# Patient Record
Sex: FEMALE | Race: WHITE
[De-identification: names, ages, dates, MRNs, and addresses within clinical notes are randomized per-mention and may not be internally consistent; named-entity substitution may affect disease eponyms.]

---

## 2019-06-11 ENCOUNTER — HOSPITAL ENCOUNTER (INPATIENT)
Dept: HOSPITAL 84 - D.PSYCH | Age: 84
LOS: 10 days | Discharge: HOME | DRG: 57 | End: 2019-06-21
Attending: PSYCHIATRY & NEUROLOGY | Admitting: PSYCHIATRY & NEUROLOGY
Payer: MEDICARE

## 2019-06-11 VITALS
BODY MASS INDEX: 26.36 KG/M2 | WEIGHT: 134.28 LBS | BODY MASS INDEX: 26.36 KG/M2 | BODY MASS INDEX: 26.36 KG/M2 | HEIGHT: 60 IN | HEIGHT: 60 IN | WEIGHT: 134.28 LBS

## 2019-06-11 VITALS — SYSTOLIC BLOOD PRESSURE: 162 MMHG | DIASTOLIC BLOOD PRESSURE: 65 MMHG

## 2019-06-11 DIAGNOSIS — Z85.3: ICD-10-CM

## 2019-06-11 DIAGNOSIS — E03.9: ICD-10-CM

## 2019-06-11 DIAGNOSIS — E78.5: ICD-10-CM

## 2019-06-11 DIAGNOSIS — K58.9: ICD-10-CM

## 2019-06-11 DIAGNOSIS — G30.1: Primary | ICD-10-CM

## 2019-06-11 DIAGNOSIS — F02.81: ICD-10-CM

## 2019-06-11 DIAGNOSIS — I25.10: ICD-10-CM

## 2019-06-11 DIAGNOSIS — E55.9: ICD-10-CM

## 2019-06-11 DIAGNOSIS — I10: ICD-10-CM

## 2019-06-11 NOTE — NUR
ED Tech bedside obtaining IV access. PATIENT ADMITTED FROM [place] [facility] PER [facility] STAFF. PATIENT WAS
COMBATIVE WITH STAFF AT [facility]. PATIENT REFUSED TO SHOWER FOR A
MONTH SHE WOULD THROW SHAMPOOS BOTTLES AT STAFF, VERBALLY AGGRESSION, HIT
STAFF WITH A TRASH CAN, AND ATIVAN DID NOT WORK. PATIENT IS A DNR PAPERWORK
IS ON THE CHART. WILL CALL POA AND OBTAIN CODE WORD. PATIENT REFUSED BODY
AUDIT AT THIS TIME. WILL TRY AGAIN LATER.

## 2019-06-11 NOTE — NUR
PATIENT IN WHEELCHAIR COMBATIVE WITH STAFF, BITING AND KICKING AT THE DOOR.
STAFF UNABLE TO REDIRECT PATIENT. NURSE ADMINISTERED ATIVAN 0.5 MG AND HALDOL
2 MG PER DR. DHILLON ORDER IM IN LD. WILL REASSES Q 1 HOUR FOR EFFECTIVENESS.

## 2019-06-11 NOTE — NUR
RECIEVED IN HALLWAY OUTSIDE OF NURSES STATION. VERY CONFUSED. WANDERING
AROUND. CALM AND COOPERATIVE WITH CARE AND ADMISSION ASSESSMENT. NO AGGRESSIVE
BEHAVIORS THIS EVENING. REDIRECT AND REORIENT AS NEEDED. RESTING IN BED WITH
EYES CLOSED AT THIS TIME. CONTINUE PLAN OF CARE.

## 2019-06-12 VITALS — DIASTOLIC BLOOD PRESSURE: 79 MMHG | SYSTOLIC BLOOD PRESSURE: 125 MMHG

## 2019-06-12 VITALS — SYSTOLIC BLOOD PRESSURE: 127 MMHG | DIASTOLIC BLOOD PRESSURE: 88 MMHG

## 2019-06-12 LAB
ALBUMIN SERPL-MCNC: 3.3 G/DL (ref 3.4–5)
ALP SERPL-CCNC: 67 U/L (ref 46–116)
ALT SERPL-CCNC: 20 U/L (ref 10–68)
ANION GAP SERPL CALC-SCNC: 13.8 MMOL/L (ref 8–16)
BASOPHILS NFR BLD AUTO: 0.2 % (ref 0–2)
BILIRUB SERPL-MCNC: 0.72 MG/DL (ref 0.2–1.3)
BUN SERPL-MCNC: 18 MG/DL (ref 7–18)
CALCIUM SERPL-MCNC: 8.8 MG/DL (ref 8.5–10.1)
CHLORIDE SERPL-SCNC: 104 MMOL/L (ref 98–107)
CHOLEST/HDLC SERPL: 2.3 RATIO (ref 2.3–4.1)
CO2 SERPL-SCNC: 23.9 MMOL/L (ref 21–32)
CREAT SERPL-MCNC: 1.4 MG/DL (ref 0.6–1.3)
EOSINOPHIL NFR BLD: 1 % (ref 0–7)
ERYTHROCYTE [DISTWIDTH] IN BLOOD BY AUTOMATED COUNT: 13.5 % (ref 11.5–14.5)
EST. AVERAGE GLUCOSE BLD GHB EST-MCNC: 126 MG/DL (ref 74–154)
GLOBULIN SER-MCNC: 3.8 G/L
GLUCOSE SERPL-MCNC: 89 MG/DL (ref 74–106)
HCT VFR BLD CALC: 36.9 % (ref 36–48)
HDLC SERPL-MCNC: 62 MG/DL (ref 32–96)
HGB BLD-MCNC: 12.4 G/DL (ref 12–16)
IMM GRANULOCYTES NFR BLD: 0.2 % (ref 0–5)
LDL-HDL RATIO: 1 RATIO (ref 1.5–3.5)
LDLC SERPL-MCNC: 60 MG/DL (ref 0–100)
LYMPHOCYTES NFR BLD AUTO: 27.7 % (ref 15–50)
MCH RBC QN AUTO: 29.2 PG (ref 26–34)
MCHC RBC AUTO-ENTMCNC: 33.6 G/DL (ref 31–37)
MCV RBC: 87 FL (ref 80–100)
MONOCYTES NFR BLD: 10.1 % (ref 2–11)
NEUTROPHILS NFR BLD AUTO: 60.8 % (ref 40–80)
OSMOLALITY SERPL CALC.SUM OF ELEC: 276 MOSM/KG (ref 275–300)
PLATELET # BLD: 238 10X3/UL (ref 130–400)
PMV BLD AUTO: 9.1 FL (ref 7.4–10.4)
POTASSIUM SERPL-SCNC: 3.7 MMOL/L (ref 3.5–5.1)
PROT SERPL-MCNC: 7.1 G/DL (ref 6.4–8.2)
RBC # BLD AUTO: 4.24 10X6/UL (ref 4–5.4)
SODIUM SERPL-SCNC: 138 MMOL/L (ref 136–145)
TRIGL SERPL-MCNC: 96 MG/DL (ref 30–200)
TSH SERPL-ACNC: 5.26 UIU/ML (ref 0.36–3.74)
WBC # BLD AUTO: 6 10X3/UL (ref 4.8–10.8)

## 2019-06-12 NOTE — NUR
PATIENT IS AWAKE AND ORIENTED TO SELF.  SHE GOT AGGRESSIVE WITH STAFF WHEN SHE
INSISTED TO GO TO HER ROOM AND LAY DOWN.  SHE HIT THIS NURSE AND GRABBED THE
PHONE CORD.  REDIRECT AND REORIENT AS NEEDED.  WILL CONTINUE PLAN OF CARE.

## 2019-06-12 NOTE — NUR
Nutrition note:
Reviewed chart. Limited information available at this time. Will follow up
with full nutrition assessment tomorrow when more information is available
RD following

## 2019-06-12 NOTE — NUR
RECEIVED IN DAYROOM. WANDERING AROUND. CALM AND COOPERATIVE WITH CARE AND
ASSESSMENT. NO AGGRESSIVE BEHAVIORS. REDIRECT AND REORIENT AS NEEDED. RESTING
IN RECLINER WITH EYES OPEN AT THIS TIME. CONTINUE PLAN OF CARE.

## 2019-06-13 VITALS — DIASTOLIC BLOOD PRESSURE: 88 MMHG | SYSTOLIC BLOOD PRESSURE: 128 MMHG

## 2019-06-13 VITALS — DIASTOLIC BLOOD PRESSURE: 79 MMHG | SYSTOLIC BLOOD PRESSURE: 133 MMHG

## 2019-06-13 LAB
FOLATE SERPL-MCNC: 10.9 NG/ML (ref 3–?)
VIT B12 SERPL-MCNC: 352 PG/ML (ref 232–1245)

## 2019-06-13 NOTE — PSY
PATIENT NAME:LORNA KLEIN                                  MEDICAL RECORD: [id number]
: 32                                              LOCATION:[facility]
ADMISSION DATE: 19    ACCOUNT: [id number]
                                                           
PSYCHIATRIC EVALUATION
 
 
DATE OF EVALUATION: 19
 
 
IDENTIFYING DATA:  The patient is 87 years old and she is admitted to the
hospital on a voluntary basis.
 
CHIEF COMPLAINT:  Aggression.
 
HISTORY OF PRESENT ILLNESS:  The patient lives in the [facility]
[place].  She has a known history of dementia.  She has been very aggressive
there, refusing care and barricading the door of her apartment.  She has been
throwing things at the staff and apparently she has refused to bathe for over a
month.  On interview, the patient is not oriented.  She denies all of these
behaviors angrily and tells me that she has to leave here because she has to be
at school tomorrow.  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
is a teacher.  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  She also
says she is , but that her  is in the [employer] and he is gone, but
cannot tell her where he goes or when he will be back.
 
PAST MEDICAL HISTORY:  Significant for hypercholesterolemia and hypertension. 
The patient also has a history of osteoarthritis.  The patient also has a
history of hypothyroidism.
 
PAST PSYCHIATRIC HISTORY:  Significant for an established diagnosis of dementia,
although I do not know who made the diagnosis, when it was made, or even how it
was made.
 
ALLERGIES:  SULFA AND MACROBID.
 
CURRENT MEDICATIONS:  Include Synthroid, Zocor, and Norvasc.
 
SOCIAL HISTORY:  The patient is apparently .  She does have adult
daughters who are involved with her care.  She denies a history of drug or
alcohol abuse, but her history is certainly unreliable.
 
MENTAL STATUS EXAMINATION:  The patient is alert and oriented to person only. 
Her mood is flat.  Her affect is constricted.  Thought processes are
circumstantial.  Memory, concentration, and abstraction abilities are moderately
impaired and she denies any intent to harm herself or others as well as
psychotic symptoms.
 
ASSETS:  Supportive family members.
 
LIABILITIES:  Limited insight.
 
DIAGNOSTIC IMPRESSION:
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
AXIS II:  None.
AXIS III:  Hypothyroidism, hypercholesterolemia, and hypertension.
AXIS IV:  Moderate.
AXIS V:  Global assessment of functioning is 30.
 
 
PLAN:  At this time, the patient is admitted to the hospital secondary to
agitated behavior associated with a dementing illness.  She will be
comprehensively monitored and treated with both mood stabilizing and memory
enhancing medications.  She will be transitioned to the least restrictive
environment that can meet her needs.
 
TRANSINT:[id number] Voice Confirmation ID: [id number] DOCUMENT ID: [id number]
                                           
                                           ANNE BRYAN MD             
 
 
 
Electronically Signed by ANNE BRYAN on 19 at 1503
 
 
 
 
 
 
 
 
 
 
 
 
 
 
 
 
 
 
 
 
 
 
 
 
 
 
 
 
 
 
 
 
 
 
 
 
 
 
CC:                                                             [id number]
DICTATION DATE: 19 1529     :     19 1625      ADM IN  
                                                                              
[address] [place] [address]

## 2019-06-13 NOTE — NUR
RECEIVED IN PATIENT ROOM. GETTING READY FOR BED. CALM AND COOPERTIVE WITH CARE
AND ASSESSMENT. NO AGGRESSIVE BEHAVIORS. REDIRECT AND REORIENT AS NEEDED.
RESTING IN BED WITH EYES CLOSED AT THIS TIME. CONTINUE PLAN OF CARE.

## 2019-06-13 NOTE — NUR
LE:  RECEIVED PATIENT IN DINING ROOM FOR B'FAST, ALERT, CALM, COOPERATIVE.
 
MEDS ADMIN PER ORDERS WITH COMPLETE MED COMPLIANCE NOTED.
 
COOPERATIVE WITH GROUP AND STAFF REQUESTS.
 
CONT POC INCLUDING  MEDS AND GROUP THERAPY AS DIRECTED.

## 2019-06-13 NOTE — NUR
RECEIVED PATIENT
IN DINING ROOM FOR B'FAST, ALERT, CALM, COOPERATIVE, CONFUSED.  NO ADVERSE
BEHAVIORS NOTED.
 
MEDS ADMIN PER ORDERS WITH COMPLETE MED COMPLIANCE NOTED.
 
COOPERATIVE WITH GROUP AND STAFF REQUESTS.
 
CONT POC AS ORDERED.

## 2019-06-14 VITALS — SYSTOLIC BLOOD PRESSURE: 131 MMHG | DIASTOLIC BLOOD PRESSURE: 75 MMHG

## 2019-06-14 VITALS — DIASTOLIC BLOOD PRESSURE: 82 MMHG | SYSTOLIC BLOOD PRESSURE: 143 MMHG

## 2019-06-14 NOTE — NUR
RECEIVED PATIENT IN DINING ROOM FOR B'FAST, ALERT, CALM, COOPERATIVE, NO
AGGRESSION NOTED.
 
MEDS ADMIN PER ORDERS WITH COMPLETE MED COMPLIANCE NOTED.
 
COOPERATIVE WITH GROUP THERAPY AND STAFF REQUESTS.
 
CONT POC INCLUDING MEDS AND GROUP THERAPY AS DIRECTED.

## 2019-06-14 NOTE — PN
PATIENT:LORNA KLEIN                              MEDICAL RECORD: [id number]
                                                         LOCATION:[facility]
                                                         ADMISSION DATE: 06/11/19
 
PROGRESS NOTE
 
 
DATE OF SERVICE:  06/13/2019
 
SUBJECTIVE:  The patient's case was discussed with staff.  She has no new
complaint.
 
OBJECTIVE:  The patient is in good behavioral control.  She has no active
thoughts of harming herself or others.
 
She is tolerating her medicines well.  Her TSH is slightly elevated.  It is
unclear if she has been taking her Synthroid at home at [facility], but I do not
think she has.  In fact, they said she had been refusing medicines for the past
month.  She is taking them here, so I do not see any reason to increase the dose
of her Synthroid today.  I am, however, going to start her on Aricept because of
her cognitive impairment.  Aricept is being used to treat her underlying
cognitive impairment.  She will be monitored for clinical changes associated
with its use.  Her long-term prognosis is guarded.
 
TRANSINT:[id number] Voice Confirmation ID: [id number] DOCUMENT ID: [id number]
 
 
 
 
                                           
                                           ANNE BRYAN MD             
 
 
 
Electronically Signed by ANNE BRYAN on 06/14/19 at 1553
 
 
 
 
 
 
 
 
 
 
 
 
 
 
 
 
CC:                                                             [id number]
DICTATION DATE: 06/13/19 1556     :     06/13/19 1724      ADM IN  
                                                                              
[id number] [place] [id number]

## 2019-06-14 NOTE — NUR
PATIENT HAS A FLAT AFFECT, QUIET, FOLLOWS DIRECTIONS WITH ASSISTANCE,
COMPLIANT WITH MEDS. NO ADVERSE REACTION NOTED. WILL FOLLOW POC

## 2019-06-15 VITALS — SYSTOLIC BLOOD PRESSURE: 157 MMHG | DIASTOLIC BLOOD PRESSURE: 75 MMHG

## 2019-06-15 VITALS — SYSTOLIC BLOOD PRESSURE: 127 MMHG | DIASTOLIC BLOOD PRESSURE: 69 MMHG

## 2019-06-15 NOTE — PN
PATIENT:LORNA KLEIN                              MEDICAL RECORD: [id number]
                                                         LOCATION:[facility]
                                                         ADMISSION DATE: 06/11/19
 
PROGRESS NOTE
 
 
DATE OF SERVICE:  06/14/2019
 
SUBJECTIVE:  The patient's case was discussed with staff.  She has no new
complaint.
 
OBJECTIVE:  The patient is not eating very well.  She is pretty limited in her
insight about her situation.  She has not been aggressive.
 
ASSESSMENT:  Senile dementia of the Alzheimer's type with behavioral
disturbances.
 
PLAN:  The patient is going to be put on Megace to assist with appetite
stimulation.  Her long-term prognosis is guarded.
 
TRANSINT:[id number] Voice Confirmation ID: [id number] DOCUMENT ID: [id number]
 
 
 
 
                                           
                                           ANNE BRYAN MD             
 
 
 
Electronically Signed by ANNE BRYAN on 06/15/19 at 1129
 
 
 
 
 
 
 
 
 
 
 
 
 
 
 
 
 
 
 
CC:                                                             [id number]
DICTATION DATE: 06/14/19 1729     :     06/14/19 2124      ADM IN  
                                                                              
[facility]                                          
[address] [place] [address]

## 2019-06-15 NOTE — NUR
B) Patient is alert and oriented to self, quiet and keeping to herself,
responses to interactions with staff,
I) Administered scheduled medications as ordered, monitored for needs
R) Mediation compliant, pleasant and cooperative,
P) Continue plan of care.

## 2019-06-15 NOTE — NUR
B) The patient is awake and alert, she is pleasant, but has poor short term
memory and poor insight into her situation. She ambulates independently. I)
Provide prescribed meds. R) The patient is compliant with medication and unit
milieu. She has not shown any aggression today. P) Continue POC.

## 2019-06-16 VITALS — SYSTOLIC BLOOD PRESSURE: 139 MMHG | DIASTOLIC BLOOD PRESSURE: 64 MMHG

## 2019-06-16 VITALS — SYSTOLIC BLOOD PRESSURE: 142 MMHG | DIASTOLIC BLOOD PRESSURE: 80 MMHG

## 2019-06-16 NOTE — PN
PATIENT:LORNA KLEIN                              MEDICAL RECORD: [id number]
                                                         LOCATION:[facility]
                                                         ADMISSION DATE: 06/11/19
 
PROGRESS NOTE
 
 
DATE OF SERVICE:  06/15/2019
 
SUBJECTIVE:  The patient's case was discussed with staff.  She has no new
complaint.
 
OBJECTIVE:  The patient is in good behavioral control with limited insight about
her condition.  She is not eating very adequately.  I have spoken with her about
this again, she says she is going to try to do better.  She tells me she just
simply does not have an appetite.  She has been started on Megace hopefully that
will assist her with appetite stimulation.
 
TRANSINT:[id number] Voice Confirmation ID: [id number] DOCUMENT ID: [id number]
 
 
 
 
                                           
                                           ANNE BRYAN MD             
 
 
 
Electronically Signed by ANNE BRYAN on 06/16/19 at 1215
 
 
 
 
 
 
 
 
 
 
 
 
 
 
 
 
 
 
 
 
 
 
CC:                                                             [id number]
DICTATION DATE: 06/15/19 1246     :     06/15/19 1338      ADM IN  
                                                                              
[id number] [place] [address]

## 2019-06-16 NOTE — NUR
PT IS ALERT AND ORIENTED. CALM AND COOPERATIVE WITH ASSESSMENT. REDIRECT AND
REORIENT AS NEEDED. MED COMPLIANT. FALL PRECAUTIONS IN PLACE. WILL CPOC.

## 2019-06-17 VITALS — DIASTOLIC BLOOD PRESSURE: 56 MMHG | SYSTOLIC BLOOD PRESSURE: 99 MMHG

## 2019-06-17 VITALS — SYSTOLIC BLOOD PRESSURE: 134 MMHG | DIASTOLIC BLOOD PRESSURE: 84 MMHG

## 2019-06-17 NOTE — NUR
RECEIVED IN DAYROOM. SITTING IN A CHAIR WITH PEERS AT HER SIDE. NO SIGNS OF
AGGRESSION. REDIRECT AND REORIENT AS NEEDED. RESTING IN BED WITH EYES CLOSED
AT THIS TIME. CONTINUE PLAN OF CARE

## 2019-06-17 NOTE — NUR
AWAKE AND ALERT WITH CONFUSION NOTED.  CALM AND COOPERATIVE WITH CARE AND
ASSESSMENT.  NO AGGRESSION NOTED.  MEDICATION COMPLIANT.  REDIRECT AND
REORIENT AS NEEDED.  WILL CONTINUE POC.

## 2019-06-17 NOTE — NUR
Nutrition follow up
Pt is on a regular diet with 27% average po intake
Started on Megace
Will add Ensure
RD following

## 2019-06-17 NOTE — PN
PATIENT:LORNA KLEIN                              MEDICAL RECORD: [id number]
                                                         LOCATION:[facility]
                                                         ADMISSION DATE: 06/11/19
 
PROGRESS NOTE
 
 
DATE OF SERVICE:  06/16/2019
 
SUBJECTIVE:  The patient's case was discussed with staff.  She has no new
complaint.
 
OBJECTIVE:  The patient is participating in treatment reasonably well.  She does
have a slightly elevated TSH, but I am not sure if she was taking her
medications prior to admission.  I have reviewed her other medications and will
maintain them.
 
ASSESSMENT:  Senile dementia of the Alzheimer's type with behavioral
disturbances.
 
PLAN:  Current medicines have been reviewed and will be maintained.  Supportive
and educational interventions were made.
 
TRANSINT:[id number] Voice Confirmation ID: [id number] DOCUMENT ID: [id number]
 
 
 
 
                                           
                                           ANNE BRYAN MD             
 
 
 
Electronically Signed by ANNE BRYAN on 06/17/19 at 1432
 
 
 
 
 
 
 
 
 
 
 
 
 
 
 
 
 
CC:                                                             [id number]
DICTATION DATE: 06/16/19 1252     :     06/16/19 1437      ADM IN  
                                                                              
[facility]                                          
[address] [place] [address]

## 2019-06-17 NOTE — NUR
RECEIVED IN DAYROOM. SITTING QUIETLY IN A CHAIR. CALM AND COOPERATIVE WITH
CARE AND ASSESSMENT. NO SIGNS OF AGGRESSION. REDIRECT AND REORIENT AS NEEDED.
SITTING QUIETLY AT THIS TIME. CONTINUE PLAN OF CARE

## 2019-06-18 VITALS — SYSTOLIC BLOOD PRESSURE: 110 MMHG | DIASTOLIC BLOOD PRESSURE: 68 MMHG

## 2019-06-18 VITALS — DIASTOLIC BLOOD PRESSURE: 79 MMHG | SYSTOLIC BLOOD PRESSURE: 122 MMHG

## 2019-06-18 NOTE — NUR
RECEIVED PATIENT IN DAYROOM, ALERT, CALM, COOPERATIVE, PLEASANT.
 
MEDS ADMIN PER MED NURSE WITH COMPLETE COMPLIANCE NOTED.
 
COOPERATIVE WITH GROUP ACTIVITY AND STAFF. NO AGGRESSION NOTED.
 
CONT POC INCLUDING MEDS AND GROUP ACTIVITY AS DIRECTED.

## 2019-06-18 NOTE — NUR
RECEIVED IN DAYROOM. SITTING IN A CHAIR WITH PEERS AT HER SIDE. CALM AND
COOPERATIVE WITH CARE AND ASSESSMENT. NO SIGNS OF AGGRESSION. REDIRECT AND
REORIENT AS NEEDED. CONTINUES TO SIT QUIETLY. CONTINUE PLAN OF CARE

## 2019-06-18 NOTE — PN
PATIENT:LORNA KLEIN                              MEDICAL RECORD: [id number]
                                                         LOCATION:[facility]
                                                         ADMISSION DATE: 06/11/19
 
PROGRESS NOTE
 
 
DATE OF SERVICE:  06/17/2019
 
SUBJECTIVE:  The patient's case was discussed with staff.  She has no new
complaint.
 
OBJECTIVE:  The patient is in good behavioral control.  She is not eating well. 
She has very limited insight about her condition.
 
ASSESSMENT:  Senile dementia of the Alzheimer's type with behavioral
disturbances.
 
PLAN:  The patient will be maintained on current medicines.  She will be given
Megace to assist with appetite stimulation.  Her long-term prognosis is guarded.
 
TRANSINT:[id number] Voice Confirmation ID: [id number] DOCUMENT ID: [id number]
 
 
 
 
                                           
                                           ANNE BRYAN MD             
 
 
 
Electronically Signed by ANNE BRYAN on 06/18/19 at 1317
 
 
 
 
 
 
 
 
 
 
 
 
 
 
 
 
 
 
 
CC:                                                             [id number]
DICTATION DATE: 06/17/19 1620     :     06/17/19 1911      ADM IN  
                                                                              
[facility]                                          
[address] [place] [address]

## 2019-06-19 VITALS — DIASTOLIC BLOOD PRESSURE: 83 MMHG | SYSTOLIC BLOOD PRESSURE: 144 MMHG

## 2019-06-19 VITALS — DIASTOLIC BLOOD PRESSURE: 71 MMHG | SYSTOLIC BLOOD PRESSURE: 124 MMHG

## 2019-06-19 NOTE — NUR
PATIENT IS QUIET, STAYS TO HERSELF, FOLLOWS DIRECTIONS, COMPLIANT WITH MEDS,
NO ADVERSE REACTION NOTE. WILL FOLLOW POC

## 2019-06-19 NOTE — PN
PATIENT:LORNA KLEIN                              MEDICAL RECORD: [id number]
                                                         LOCATION:[facility]
                                                         ADMISSION DATE: 06/11/19
 
PROGRESS NOTE
 
 
DATE OF SERVICE:  06/18/2019
 
SUBJECTIVE:  The patient's case was discussed with staff.  She has no new
complaint.
 
OBJECTIVE:  The patient is receiving Megace for appetite stimulation, so far it
has not been effective.  I have spoken to her about her eating.  She says that
she is doing the best she can and I have no reason to doubt this.  I am
concerned about her long term.  Right now, she is not underweight, but she is
eating virtually nothing.  She is not going to have a very good outcome if she
does not begin eating soon.
 
ASSESSMENT:  Senile dementia of the Alzheimer's type with behavioral
disturbances.
 
PLAN:  Current medicines have been reviewed and will be maintained.  Long-term
prognosis is guarded.
 
TRANSINT:[id number] Voice Confirmation ID: [id number] DOCUMENT ID: [id number]
 
 
 
 
                                           
                                           ANNE BRYAN MD             
 
 
 
Electronically Signed by ANNE BRYAN on 06/19/19 at 1327
 
 
 
 
 
 
 
 
 
 
 
 
 
 
 
CC:                                                             [id number]
DICTATION DATE: 06/18/19 1334     :     06/18/19 1412      ADM IN  
                                                                              
[facility]                                          
[address] [place] [address]

## 2019-06-19 NOTE — NUR
AWAKE AND ALERT WITH CONFUSION NOTED.  NO AGGRESSIVE BEHAVIORS NOTED.  CALM
AND COOPERATOVE WITH CARE AND ASSESSMENT.  REDIRECT AND REORIENT AS NEEDED.
WILL CONTINUE PLAN OF CARE.

## 2019-06-20 VITALS — DIASTOLIC BLOOD PRESSURE: 60 MMHG | SYSTOLIC BLOOD PRESSURE: 120 MMHG

## 2019-06-20 NOTE — NUR
B) The patient is awake and alert, she is confused and has poor insight into
her situation. She ambulates independently. I) Provide prescribed meds. R) The
patient is compliant with meds and unit milieu. P) Continue POC.

## 2019-06-20 NOTE — PN
PATIENT:LORNA KLEIN                              MEDICAL RECORD: [id number]
                                                         LOCATION:[facility]
                                                         ADMISSION DATE: 06/11/19
 
PROGRESS NOTE
 
 
DATE OF SERVICE:  06/19/2019
 
SUBJECTIVE:  The patient's case was discussed with staff.  She has no new
complaint.
 
OBJECTIVE:  The patient denies intent to harm herself or others.  She is
tolerating her medicines well.
 
ASSESSMENT:  No change in diagnoses.
 
PLAN:  Current medicines and therapies have been reviewed.  She is eating
significantly better.  She will be monitored for clinical changes and I
anticipate that she can be transitioned out of the hospital soon if this level
of improvement continues.
 
TRANSINT:[id number] Voice Confirmation ID: [id number] DOCUMENT ID: [id number]
 
 
 
 
                                           
                                           ANNE BRYAN MD             
 
 
 
Electronically Signed by ANNE BRYAN on 06/20/19 at 1455
 
 
 
 
 
 
 
 
 
 
 
 
 
 
 
 
 
 
CC:                                                             [id number]
DICTATION DATE: 06/19/19 1334     :     06/19/19 1357      ADM IN  
                                                                              
[id number] [place] [address]

## 2019-06-20 NOTE — NUR
PATIENT IS CONFUSED, QUIET, STAYS TO HERSELF, COMPLIANT WITH MEDS, CAN MAKE
NEEDS KNOWN, WILL FOLLOW POC

## 2019-06-21 VITALS — SYSTOLIC BLOOD PRESSURE: 167 MMHG | DIASTOLIC BLOOD PRESSURE: 79 MMHG

## 2019-06-21 NOTE — NUR
B) The patient is awake and alert, she is pleasant. She knows her name. She
has not shown aggression today. She ambulates independently. She is calm. I)
Provide prescribed meds. R) The patient is compliant with meds and unit
milieu. P) Continue POC.

## 2019-06-21 NOTE — PN
PATIENT:LORNA KLEIN                              MEDICAL RECORD: [id number]
                                                         LOCATION:[facility]
                                                         ADMISSION DATE: 06/11/19
 
PROGRESS NOTE
 
 
DATE OF SERVICE:  06/20/2019
 
SUBJECTIVE:  The patient's case was discussed with staff.  She has no new
complaint.
 
OBJECTIVE:  The patient is in good behavioral control, but very impaired
cognitively.  She has almost no insight about her situation.  She is very
difficult at times to redirect, but has not been inappropriate or agitated in
any kind of a way that would pose a risk of danger to others.
 
ASSESSMENT:  Senile dementia of the Alzheimer's type with behavioral
disturbances.
 
PLAN:  I anticipate the patient can be transitioned back to the nursing home
tomorrow.  Her long-term prognosis is guarded.  Followup will be with her
primary care nursing home physician.
 
TRANSINT:[id number] Voice Confirmation ID: [id number] DOCUMENT ID: [id number]
 
 
 
 
                                           
                                           ANNE BRYAN MD             
 
 
 
Electronically Signed by ANNE BRYAN on 06/21/19 at 1538
 
 
 
 
 
 
 
 
 
 
 
 
 
 
 
 
CC:                                                             [id number]
DICTATION DATE: 06/20/19 1513     :     06/20/19 1539      DIS IN  
                                                                      06/21/19
[facility]                                          
[address] [place] [address]

## 2019-06-21 NOTE — NUR
The patient is assisted to the [facility]. She is being d/c'd now, all
paperwork has been faxed. All belongings are accounted for and sent with the
patient. She is now d/c'd off of the floor.

## 2019-06-22 NOTE — PN
PATIENT:LORNA KLEIN                              MEDICAL RECORD: [id number]
                                                         LOCATION:[facility]
                                                         ADMISSION DATE: 06/11/19
 
PROGRESS NOTE
 
 
DATE OF SERVICE:  06/21/2019
 
SUBJECTIVE:  The patient's case was discussed with staff.  She has no new
complaint.
 
OBJECTIVE:  The patient is in good behavioral control with limited insight about
her condition.  She tolerates her medicines well.
 
ASSESSMENT:  Senile dementia of the Alzheimer's type with behavioral
disturbances.
 
PLAN:  The patient will be transitioned back to the assisted living center
today.  Her long-term prognosis is guarded and followup is to be with her
primary care nursing home physician.  At this time, she does not represent an
acute risk or danger to herself or others.
 
TRANSINT:[id number] Voice Confirmation ID: [id number] DOCUMENT ID: [id number]
 
 
 
 
                                           
                                           ANNE BRYAN MD             
 
 
 
Electronically Signed by ANNE BRYAN on 06/22/19 at 1154
 
 
 
 
 
 
 
 
 
 
 
 
 
 
 
 
 
CC:                                                             [id number]
DICTATION DATE: 06/21/19 1624     :     06/21/19 2256      DIS IN  
                                                                      06/21/19
[id number] [place] [address]